# Patient Record
Sex: FEMALE | Race: WHITE | NOT HISPANIC OR LATINO | Employment: STUDENT | URBAN - METROPOLITAN AREA
[De-identification: names, ages, dates, MRNs, and addresses within clinical notes are randomized per-mention and may not be internally consistent; named-entity substitution may affect disease eponyms.]

---

## 2024-01-11 ENCOUNTER — TELEPHONE (OUTPATIENT)
Dept: OBSTETRICS AND GYNECOLOGY | Facility: CLINIC | Age: 29
End: 2024-01-11
Payer: COMMERCIAL

## 2024-01-11 NOTE — TELEPHONE ENCOUNTER
----- Message from Karen Fabio sent at 1/11/2024  3:18 PM CST -----  Contact: PT  Type:  Needs Medical Advice    Who Called: PT   Symptoms (please be specific): PT WOULD LIKE TO KNOW IF THE PROVIDER HAS PRIVILEGE TO DELIVER AT [facility]. PLEASE CALL DISCUSS.   PT HAS AMBETTER INSUR     How long has patient had these symptoms: [facility] [place]  12/11/23  Would the patient rather a call back or a response via [facility]? CALL   Best Call Back Number:  [phone]   Additional Information: THANK YOU